# Patient Record
Sex: MALE | Race: BLACK OR AFRICAN AMERICAN | NOT HISPANIC OR LATINO | Employment: PART TIME | URBAN - METROPOLITAN AREA
[De-identification: names, ages, dates, MRNs, and addresses within clinical notes are randomized per-mention and may not be internally consistent; named-entity substitution may affect disease eponyms.]

---

## 2020-06-14 ENCOUNTER — HOSPITAL ENCOUNTER (EMERGENCY)
Facility: CLINIC | Age: 55
Discharge: HOME | End: 2020-06-15
Attending: EMERGENCY MEDICINE | Admitting: EMERGENCY MEDICINE
Payer: COMMERCIAL

## 2020-06-14 ENCOUNTER — APPOINTMENT (OUTPATIENT)
Dept: GENERAL RADIOLOGY | Facility: CLINIC | Age: 55
End: 2020-06-14
Attending: EMERGENCY MEDICINE
Payer: COMMERCIAL

## 2020-06-14 DIAGNOSIS — S20.219A CONTUSION OF CHEST WALL, UNSPECIFIED LATERALITY, INITIAL ENCOUNTER: ICD-10-CM

## 2020-06-14 DIAGNOSIS — S29.019A THORACIC MYOFASCIAL STRAIN, INITIAL ENCOUNTER: ICD-10-CM

## 2020-06-14 PROCEDURE — 72072 X-RAY EXAM THORAC SPINE 3VWS: CPT

## 2020-06-14 PROCEDURE — 99285 EMERGENCY DEPT VISIT HI MDM: CPT | Mod: 25

## 2020-06-14 PROCEDURE — 71046 X-RAY EXAM CHEST 2 VIEWS: CPT

## 2020-06-14 PROCEDURE — 25000132 ZZH RX MED GY IP 250 OP 250 PS 637: Performed by: EMERGENCY MEDICINE

## 2020-06-14 RX ORDER — METHOCARBAMOL 750 MG/1
750 TABLET, FILM COATED ORAL ONCE
Status: COMPLETED | OUTPATIENT
Start: 2020-06-14 | End: 2020-06-14

## 2020-06-14 RX ORDER — IBUPROFEN 600 MG/1
600 TABLET, FILM COATED ORAL ONCE
Status: COMPLETED | OUTPATIENT
Start: 2020-06-14 | End: 2020-06-14

## 2020-06-14 RX ADMIN — IBUPROFEN 600 MG: 600 TABLET, FILM COATED ORAL at 23:23

## 2020-06-14 RX ADMIN — METHOCARBAMOL TABLETS 750 MG: 750 TABLET, COATED ORAL at 23:23

## 2020-06-14 ASSESSMENT — ENCOUNTER SYMPTOMS
CHEST TIGHTNESS: 1
BACK PAIN: 1
NECK STIFFNESS: 1

## 2020-06-14 NOTE — ED AVS SNAPSHOT
[facility] Emergency Department  [address]  [facility] [address]  Phone:  [phone]  Fax:  [phone]                                    Justice Aguirre   MRN: [id number]    Department:  [facility] Emergency Department   Date of Visit:  6/14/2020           After Visit Summary Signature Page    I have received my discharge instructions, and my questions have been answered. I have discussed any challenges I see with this plan with the nurse or doctor.    ..........................................................................................................................................  Patient/Patient Representative Signature      ..........................................................................................................................................  Patient Representative Print Name and Relationship to Patient    ..................................................               ................................................  Date                                   Time    ..........................................................................................................................................  Reviewed by Signature/Title    ...................................................              ..............................................  Date                                               Time          22EPIC Rev 08/18

## 2020-06-15 ENCOUNTER — APPOINTMENT (OUTPATIENT)
Dept: CT IMAGING | Facility: CLINIC | Age: 55
End: 2020-06-15
Attending: EMERGENCY MEDICINE
Payer: COMMERCIAL

## 2020-06-15 VITALS
RESPIRATION RATE: 20 BRPM | DIASTOLIC BLOOD PRESSURE: 77 MMHG | OXYGEN SATURATION: 97 % | TEMPERATURE: 98.2 F | HEART RATE: 64 BPM | SYSTOLIC BLOOD PRESSURE: 133 MMHG

## 2020-06-15 PROCEDURE — 72128 CT CHEST SPINE W/O DYE: CPT

## 2020-06-15 PROCEDURE — 72131 CT LUMBAR SPINE W/O DYE: CPT

## 2020-06-15 RX ORDER — METHOCARBAMOL 750 MG/1
750 TABLET, FILM COATED ORAL EVERY 6 HOURS PRN
Qty: 30 TABLET | Refills: 0 | Status: SHIPPED | OUTPATIENT
Start: 2020-06-15 | End: 2020-06-25
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
## 2020-06-15 NOTE — DISCHARGE INSTRUCTIONS
Take ibuprofen 600 mg 3x per day.  This will provide both pain control and fight against inflammation.  Use robaxin as needed for additional pain control.
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
## 2020-06-25 ENCOUNTER — APPOINTMENT (OUTPATIENT)
Dept: CT IMAGING | Facility: CLINIC | Age: 55
End: 2020-06-25
Attending: EMERGENCY MEDICINE
Payer: COMMERCIAL

## 2020-06-25 ENCOUNTER — HOSPITAL ENCOUNTER (EMERGENCY)
Facility: CLINIC | Age: 55
Discharge: HOME | End: 2020-06-25
Attending: EMERGENCY MEDICINE | Admitting: EMERGENCY MEDICINE
Payer: COMMERCIAL

## 2020-06-25 ENCOUNTER — APPOINTMENT (OUTPATIENT)
Dept: MRI IMAGING | Facility: CLINIC | Age: 55
End: 2020-06-25
Attending: EMERGENCY MEDICINE
Payer: COMMERCIAL

## 2020-06-25 VITALS
OXYGEN SATURATION: 100 % | DIASTOLIC BLOOD PRESSURE: 82 MMHG | SYSTOLIC BLOOD PRESSURE: 132 MMHG | TEMPERATURE: 98.1 F | RESPIRATION RATE: 18 BRPM | HEART RATE: 57 BPM

## 2020-06-25 DIAGNOSIS — R29.898 WEAKNESS OF FOOT, LEFT: ICD-10-CM

## 2020-06-25 DIAGNOSIS — R68.89: ICD-10-CM

## 2020-06-25 DIAGNOSIS — S06.0X0A CONCUSSION WITHOUT LOSS OF CONSCIOUSNESS, INITIAL ENCOUNTER: ICD-10-CM

## 2020-06-25 DIAGNOSIS — R91.1 LUNG NODULE: ICD-10-CM

## 2020-06-25 LAB
ANION GAP SERPL CALCULATED.3IONS-SCNC: 4 MMOL/L (ref 3–14)
BASOPHILS # BLD AUTO: 0.1 10E9/L (ref 0–0.2)
BASOPHILS NFR BLD AUTO: 0.7 %
BUN SERPL-MCNC: 18 MG/DL (ref 7–30)
CALCIUM SERPL-MCNC: 8.8 MG/DL (ref 8.5–10.1)
CHLORIDE SERPL-SCNC: 104 MMOL/L (ref 94–109)
CO2 SERPL-SCNC: 30 MMOL/L (ref 20–32)
CREAT SERPL-MCNC: 1.41 MG/DL (ref 0.66–1.25)
DIFFERENTIAL METHOD BLD: NORMAL
EOSINOPHIL # BLD AUTO: 0.4 10E9/L (ref 0–0.7)
EOSINOPHIL NFR BLD AUTO: 4.5 %
ERYTHROCYTE [DISTWIDTH] IN BLOOD BY AUTOMATED COUNT: 14.5 % (ref 10–15)
GFR SERPL CREATININE-BSD FRML MDRD: 55 ML/MIN/{1.73_M2}
GLUCOSE SERPL-MCNC: 82 MG/DL (ref 70–99)
HCT VFR BLD AUTO: 49.2 % (ref 40–53)
HGB BLD-MCNC: 15.5 G/DL (ref 13.3–17.7)
IMM GRANULOCYTES # BLD: 0 10E9/L (ref 0–0.4)
IMM GRANULOCYTES NFR BLD: 0.2 %
LYMPHOCYTES # BLD AUTO: 3.6 10E9/L (ref 0.8–5.3)
LYMPHOCYTES NFR BLD AUTO: 41.3 %
MCH RBC QN AUTO: 28.5 PG (ref 26.5–33)
MCHC RBC AUTO-ENTMCNC: 31.5 G/DL (ref 31.5–36.5)
MCV RBC AUTO: 90 FL (ref 78–100)
MONOCYTES # BLD AUTO: 0.6 10E9/L (ref 0–1.3)
MONOCYTES NFR BLD AUTO: 7.5 %
NEUTROPHILS # BLD AUTO: 3.9 10E9/L (ref 1.6–8.3)
NEUTROPHILS NFR BLD AUTO: 45.8 %
NRBC # BLD AUTO: 0 10*3/UL
NRBC BLD AUTO-RTO: 0 /100
PLATELET # BLD AUTO: 215 10E9/L (ref 150–450)
POTASSIUM SERPL-SCNC: 4.3 MMOL/L (ref 3.4–5.3)
RBC # BLD AUTO: 5.44 10E12/L (ref 4.4–5.9)
SODIUM SERPL-SCNC: 138 MMOL/L (ref 133–144)
WBC # BLD AUTO: 8.6 10E9/L (ref 4–11)

## 2020-06-25 PROCEDURE — 85025 COMPLETE CBC W/AUTO DIFF WBC: CPT | Performed by: EMERGENCY MEDICINE

## 2020-06-25 PROCEDURE — 99284 EMERGENCY DEPT VISIT MOD MDM: CPT | Mod: 25

## 2020-06-25 PROCEDURE — 70498 CT ANGIOGRAPHY NECK: CPT

## 2020-06-25 PROCEDURE — 25000125 ZZHC RX 250: Performed by: EMERGENCY MEDICINE

## 2020-06-25 PROCEDURE — 25000128 H RX IP 250 OP 636: Performed by: EMERGENCY MEDICINE

## 2020-06-25 PROCEDURE — 25000132 ZZH RX MED GY IP 250 OP 250 PS 637: Performed by: EMERGENCY MEDICINE

## 2020-06-25 PROCEDURE — 70553 MRI BRAIN STEM W/O & W/DYE: CPT

## 2020-06-25 PROCEDURE — 25500064 ZZH RX 255 OP 636: Performed by: EMERGENCY MEDICINE

## 2020-06-25 PROCEDURE — 72125 CT NECK SPINE W/O DYE: CPT

## 2020-06-25 PROCEDURE — 70450 CT HEAD/BRAIN W/O DYE: CPT

## 2020-06-25 PROCEDURE — 80048 BASIC METABOLIC PNL TOTAL CA: CPT | Performed by: EMERGENCY MEDICINE

## 2020-06-25 PROCEDURE — A9585 GADOBUTROL INJECTION: HCPCS | Performed by: EMERGENCY MEDICINE

## 2020-06-25 RX ORDER — ACETAMINOPHEN 325 MG/1
650 TABLET ORAL ONCE
Status: COMPLETED | OUTPATIENT
Start: 2020-06-25 | End: 2020-06-25

## 2020-06-25 RX ORDER — GADOBUTROL 604.72 MG/ML
10 INJECTION INTRAVENOUS ONCE
Status: COMPLETED | OUTPATIENT
Start: 2020-06-25 | End: 2020-06-25

## 2020-06-25 RX ORDER — CYCLOBENZAPRINE HCL 10 MG
5-10 TABLET ORAL 3 TIMES DAILY PRN
Qty: 10 TABLET | Refills: 0 | Status: SHIPPED | OUTPATIENT
Start: 2020-06-25

## 2020-06-25 RX ORDER — IOPAMIDOL 755 MG/ML
500 INJECTION, SOLUTION INTRAVASCULAR ONCE
Status: COMPLETED | OUTPATIENT
Start: 2020-06-25 | End: 2020-06-25

## 2020-06-25 RX ADMIN — ACETAMINOPHEN 650 MG: 325 TABLET, FILM COATED ORAL at 02:41

## 2020-06-25 RX ADMIN — SODIUM CHLORIDE 80 ML: 9 INJECTION, SOLUTION INTRAVENOUS at 03:37

## 2020-06-25 RX ADMIN — IOPAMIDOL 70 ML: 755 INJECTION, SOLUTION INTRAVENOUS at 03:37

## 2020-06-25 RX ADMIN — GADOBUTROL 8 ML: 604.72 INJECTION INTRAVENOUS at 07:12

## 2020-06-25 ASSESSMENT — ENCOUNTER SYMPTOMS
HEADACHES: 1
LIGHT-HEADEDNESS: 1
CONFUSION: 1

## 2020-06-25 NOTE — DISCHARGE INSTRUCTIONS
Diagnosis: Probable concussion, left foot weakness, abnormal finger-to-nose test  What do you do next:   Please continue over-the-counter treatments for your headache.    There was a lung nodule noted on the CT scan of the blood vessels in your neck.  Once you follow with your primary care clinic further CT scan imaging can be performed of the chest.  Lung nodules certainly need monitoring always concerned for cancer with these things.  I have placed a lung nodule neurologist to follow-up with after you discuss the case with your primary care clinic.  I placed an order for the concussion clinic to call you.  They can help arrange further follow-up regarding the lightheadedness and other symptoms that you are feeling.    Please follow-up with your  primary care clinic.  If you do not have one, I have listed a clinic as a courtesy to you.    When do you return: If you have worsening weakness, focal weakness including loss of function of an arm, leg, or part of your face, if you have worsening dizziness, intractable vomiting, or any other symptoms that concern you, please return to the emergency department for reevaluation.    Thank you for allowing us to care for you today.

## 2020-06-25 NOTE — ED NOTES
Patient alert and oriented. Speech clear. Able to move extremities purposefully. Work note given and discharge medication discussed. Reinforced plan to discontinue robaxin and start taking flexeril. MD in to see patient and discuss diagnosis, test results, and discharge plan. Patient meets discharge criteria. Discussed AVS with patient. Questions answered. Patient verbalized understanding. Concussion clinic to call patient for follow up. Discussed recommendation to establish primary care clinic/provider. Patient reports being ready to go home. Patient discharged home with friend by car with all necessary information.
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
## 2020-06-25 NOTE — ED AVS SNAPSHOT
[facility] Emergency Department  [address]  [facility] [address]  Phone:  [phone]  Fax:  [phone]                                    Justice Aguirre   MRN: [id number]    Department:  [facility] Emergency Department   Date of Visit:  6/25/2020           After Visit Summary Signature Page    I have received my discharge instructions, and my questions have been answered. I have discussed any challenges I see with this plan with the nurse or doctor.    ..........................................................................................................................................  Patient/Patient Representative Signature      ..........................................................................................................................................  Patient Representative Print Name and Relationship to Patient    ..................................................               ................................................  Date                                   Time    ..........................................................................................................................................  Reviewed by Signature/Title    ...................................................              ..............................................  Date                                               Time          22EPIC Rev 08/18
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
## 2020-06-25 NOTE — LETTER
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  I expect his condition to improve over the next couple days.  He may return to work/school 6/27/20.    Sincerely,        Analia Muñoz RN

## 2020-06-25 NOTE — ED NOTES
Pt. Returned from MRI. [employer] monitoring equipment onto Patient (Pulse ox and BP). Phone brought for Pt.

## 2020-07-16 ENCOUNTER — HOSPITAL ENCOUNTER (OUTPATIENT)
Dept: NEUROLOGY | Facility: CLINIC | Age: 55
Setting detail: THERAPIES SERIES
Discharge: STILL ADMITTED OR ADMINISTRATIVE | End: 2020-07-16
Attending: NURSE PRACTITIONER

## 2020-07-16 DIAGNOSIS — R45.4 IRRITABILITY: ICD-10-CM

## 2020-07-16 DIAGNOSIS — R42 DIZZINESS: ICD-10-CM

## 2020-07-16 DIAGNOSIS — R11.0 NAUSEA: ICD-10-CM

## 2020-07-16 DIAGNOSIS — G44.309 POST-CONCUSSION HEADACHE: ICD-10-CM

## 2020-07-16 DIAGNOSIS — G47.00 INSOMNIA, UNSPECIFIED TYPE: ICD-10-CM

## 2020-07-16 DIAGNOSIS — R53.83 FATIGUE, UNSPECIFIED TYPE: ICD-10-CM

## 2020-07-16 DIAGNOSIS — Z76.89 RETURN TO WORK EVALUATION: ICD-10-CM

## 2020-07-16 DIAGNOSIS — F06.4 ANXIETY DISORDER DUE TO MEDICAL CONDITION: ICD-10-CM

## 2020-07-16 DIAGNOSIS — R41.3 MEMORY DIFFICULTY: ICD-10-CM

## 2020-07-16 DIAGNOSIS — R41.840 ATTENTION AND CONCENTRATION DEFICIT: ICD-10-CM

## 2020-07-16 DIAGNOSIS — R68.89 SENSITIVITY TO LIGHT: ICD-10-CM

## 2020-07-16 DIAGNOSIS — F07.81 POST CONCUSSION SYNDROME: ICD-10-CM

## 2020-07-16 DIAGNOSIS — H83.3X3 SOUND SENSITIVITY IN BOTH EARS: ICD-10-CM

## 2020-07-21 ENCOUNTER — HOSPITAL ENCOUNTER (OUTPATIENT)
Dept: NEUROLOGY | Facility: CLINIC | Age: 55
Setting detail: THERAPIES SERIES
Discharge: STILL ADMITTED OR ADMINISTRATIVE | End: 2020-07-21
Attending: CLINICAL NEUROPSYCHOLOGIST

## 2020-07-21 DIAGNOSIS — F07.81 POST CONCUSSION SYNDROME: ICD-10-CM

## 2020-07-21 DIAGNOSIS — F43.23 ADJUSTMENT DISORDER WITH MIXED ANXIETY AND DEPRESSED MOOD: ICD-10-CM

## 2020-07-21 DIAGNOSIS — G44.309 POST-CONCUSSION HEADACHE: ICD-10-CM

## 2020-07-21 DIAGNOSIS — G31.84 MILD NEUROCOGNITIVE DISORDER: ICD-10-CM

## 2020-07-27 ENCOUNTER — HOSPITAL ENCOUNTER (OUTPATIENT)
Dept: NEUROLOGY | Facility: CLINIC | Age: 55
Setting detail: THERAPIES SERIES
Discharge: STILL ADMITTED OR ADMINISTRATIVE | End: 2020-07-27
Attending: CLINICAL NEUROPSYCHOLOGIST

## 2020-07-27 DIAGNOSIS — F43.23 ADJUSTMENT DISORDER WITH MIXED ANXIETY AND DEPRESSED MOOD: ICD-10-CM

## 2020-07-27 DIAGNOSIS — G31.84 MILD NEUROCOGNITIVE DISORDER: ICD-10-CM

## 2020-07-29 ENCOUNTER — HOSPITAL ENCOUNTER (OUTPATIENT)
Dept: PHYSICAL THERAPY | Facility: CLINIC | Age: 55
Setting detail: THERAPIES SERIES
End: 2020-07-29
Attending: NURSE PRACTITIONER
Payer: COMMERCIAL

## 2020-07-29 PROCEDURE — 97161 PT EVAL LOW COMPLEX 20 MIN: CPT | Mod: GP | Performed by: PHYSICAL THERAPIST

## 2020-07-29 PROCEDURE — 97112 NEUROMUSCULAR REEDUCATION: CPT | Mod: GP | Performed by: PHYSICAL THERAPIST

## 2020-07-29 PROCEDURE — 97110 THERAPEUTIC EXERCISES: CPT | Mod: GP | Performed by: PHYSICAL THERAPIST
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
## 2020-08-04 ENCOUNTER — HOSPITAL ENCOUNTER (OUTPATIENT)
Dept: NEUROLOGY | Facility: CLINIC | Age: 55
Setting detail: THERAPIES SERIES
Discharge: STILL ADMITTED OR ADMINISTRATIVE | End: 2020-08-04
Attending: NURSE PRACTITIONER

## 2020-08-04 DIAGNOSIS — F06.4 ANXIETY DISORDER DUE TO MEDICAL CONDITION: ICD-10-CM

## 2020-08-04 DIAGNOSIS — R53.83 FATIGUE, UNSPECIFIED TYPE: ICD-10-CM

## 2020-08-04 DIAGNOSIS — R41.3 MEMORY DIFFICULTIES: ICD-10-CM

## 2020-08-04 DIAGNOSIS — Z76.89 RETURN TO WORK EVALUATION: ICD-10-CM

## 2020-08-04 DIAGNOSIS — F07.81 POST CONCUSSION SYNDROME: ICD-10-CM

## 2020-08-04 DIAGNOSIS — R41.840 ATTENTION AND CONCENTRATION DEFICIT: ICD-10-CM

## 2020-08-04 DIAGNOSIS — R45.4 IRRITABILITY: ICD-10-CM

## 2020-08-04 DIAGNOSIS — R11.0 NAUSEA: ICD-10-CM

## 2020-08-04 DIAGNOSIS — G44.309 POST-CONCUSSION HEADACHE: ICD-10-CM

## 2020-08-04 DIAGNOSIS — G47.00 INSOMNIA, UNSPECIFIED TYPE: ICD-10-CM

## 2020-08-04 DIAGNOSIS — R42 DIZZINESS: ICD-10-CM

## 2020-08-20 ENCOUNTER — HOSPITAL ENCOUNTER (OUTPATIENT)
Dept: NEUROLOGY | Facility: CLINIC | Age: 55
Setting detail: THERAPIES SERIES
Discharge: STILL ADMITTED OR ADMINISTRATIVE | End: 2020-08-20
Attending: NURSE PRACTITIONER

## 2020-08-20 DIAGNOSIS — R45.4 IRRITABILITY: ICD-10-CM

## 2020-08-20 DIAGNOSIS — M25.512 ACUTE PAIN OF LEFT SHOULDER: ICD-10-CM

## 2020-08-20 DIAGNOSIS — F06.4 ANXIETY DISORDER DUE TO MEDICAL CONDITION: ICD-10-CM

## 2020-08-20 DIAGNOSIS — Z76.89 RETURN TO WORK EVALUATION: ICD-10-CM

## 2020-08-20 DIAGNOSIS — R41.840 ATTENTION AND CONCENTRATION DEFICIT: ICD-10-CM

## 2020-08-20 DIAGNOSIS — R41.3 MEMORY DIFFICULTIES: ICD-10-CM

## 2020-08-20 DIAGNOSIS — R42 DIZZINESS: ICD-10-CM

## 2020-08-20 DIAGNOSIS — G44.309 POST-CONCUSSION HEADACHE: ICD-10-CM

## 2020-08-20 DIAGNOSIS — R53.83 FATIGUE, UNSPECIFIED TYPE: ICD-10-CM

## 2020-08-20 DIAGNOSIS — H83.3X3 SOUND SENSITIVITY IN BOTH EARS: ICD-10-CM

## 2020-08-20 DIAGNOSIS — R68.89 SENSITIVITY TO LIGHT: ICD-10-CM

## 2020-08-20 DIAGNOSIS — G47.00 INSOMNIA, UNSPECIFIED TYPE: ICD-10-CM

## 2020-08-20 DIAGNOSIS — F07.81 POSTCONCUSSION SYNDROME: ICD-10-CM

## 2020-09-08 ENCOUNTER — OFFICE VISIT (OUTPATIENT)
Dept: URGENT CARE | Facility: URGENT CARE | Age: 55
End: 2020-09-08
Payer: COMMERCIAL

## 2020-09-08 VITALS
TEMPERATURE: 98.4 F | WEIGHT: 188 LBS | DIASTOLIC BLOOD PRESSURE: 89 MMHG | HEART RATE: 84 BPM | SYSTOLIC BLOOD PRESSURE: 159 MMHG | RESPIRATION RATE: 14 BRPM | OXYGEN SATURATION: 98 %

## 2020-09-08 DIAGNOSIS — M25.512 LEFT SHOULDER PAIN, UNSPECIFIED CHRONICITY: Primary | ICD-10-CM

## 2020-09-08 PROBLEM — M51.27 HERNIATION OF INTERVERTEBRAL DISC BETWEEN L5 AND S1: Status: ACTIVE | Noted: 2019-01-14

## 2020-09-08 PROCEDURE — 99203 OFFICE O/P NEW LOW 30 MIN: CPT | Performed by: PHYSICIAN ASSISTANT

## 2020-09-08 RX ORDER — ALBUTEROL SULFATE 90 UG/1
AEROSOL, METERED RESPIRATORY (INHALATION)
COMMUNITY
Start: 2020-03-04

## 2020-09-08 RX ORDER — OMEPRAZOLE 40 MG/1
40 CAPSULE, DELAYED RELEASE ORAL
COMMUNITY
Start: 2019-01-28

## 2020-09-08 RX ORDER — GABAPENTIN 400 MG/1
400 CAPSULE ORAL
COMMUNITY

## 2020-09-08 RX ORDER — BUPROPION HYDROCHLORIDE 150 MG/1
300 TABLET ORAL
COMMUNITY
Start: 2020-08-04 | End: 2021-08-26

## 2020-09-08 RX ORDER — NAPROXEN 500 MG/1
500 TABLET ORAL
COMMUNITY
Start: 2019-01-14

## 2020-09-08 RX ORDER — METHYLPHENIDATE HYDROCHLORIDE 5 MG/1
5 TABLET ORAL
COMMUNITY
Start: 2020-08-20

## 2020-09-08 RX ORDER — ERGOCALCIFEROL 1.25 MG/1
CAPSULE, LIQUID FILLED ORAL
COMMUNITY
Start: 2019-01-28

## 2020-09-08 RX ORDER — FLUTICASONE PROPIONATE 50 MCG
SPRAY, SUSPENSION (ML) NASAL
COMMUNITY
Start: 2019-09-04

## 2020-09-08 ASSESSMENT — ENCOUNTER SYMPTOMS
CHILLS: 0
VOMITING: 0
ABDOMINAL PAIN: 0
LIGHT-HEADEDNESS: 0
GASTROINTESTINAL NEGATIVE: 1
SHORTNESS OF BREATH: 0
COUGH: 0
DYSURIA: 0
EYES NEGATIVE: 1
ENDOCRINE NEGATIVE: 1
DIZZINESS: 0
WEAKNESS: 0
FEVER: 0
NEUROLOGICAL NEGATIVE: 1
ARTHRALGIAS: 1
CARDIOVASCULAR NEGATIVE: 1
DIAPHORESIS: 0
NAUSEA: 0
HEMATURIA: 0
DIARRHEA: 0
RESPIRATORY NEGATIVE: 1
HEADACHES: 0
PALPITATIONS: 0
WHEEZING: 0
MYALGIAS: 0
RHINORRHEA: 0
EYE REDNESS: 0
CONSTITUTIONAL NEGATIVE: 1
ADENOPATHY: 0
SORE THROAT: 0
EYE DISCHARGE: 0
CHEST TIGHTNESS: 0
FREQUENCY: 0
EYE ITCHING: 0
POLYDIPSIA: 0

## 2020-09-08 ASSESSMENT — PAIN SCALES - GENERAL: PAINLEVEL: WORST PAIN (10)
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
## 2020-09-18 ENCOUNTER — HOSPITAL ENCOUNTER (OUTPATIENT)
Dept: NEUROLOGY | Facility: CLINIC | Age: 55
Setting detail: THERAPIES SERIES
Discharge: STILL ADMITTED OR ADMINISTRATIVE | End: 2020-09-18
Attending: NURSE PRACTITIONER

## 2020-09-18 DIAGNOSIS — R41.840 ATTENTION AND CONCENTRATION DEFICIT: ICD-10-CM

## 2020-09-18 DIAGNOSIS — F07.81 POSTCONCUSSION SYNDROME: ICD-10-CM

## 2020-09-18 DIAGNOSIS — R53.83 FATIGUE, UNSPECIFIED TYPE: ICD-10-CM

## 2020-09-18 DIAGNOSIS — R68.89 SENSITIVITY TO LIGHT: ICD-10-CM

## 2020-09-18 DIAGNOSIS — R45.4 IRRITABILITY: ICD-10-CM

## 2020-09-18 DIAGNOSIS — G44.309 POST-CONCUSSION HEADACHE: ICD-10-CM

## 2020-09-18 DIAGNOSIS — H83.3X3 SOUND SENSITIVITY IN BOTH EARS: ICD-10-CM

## 2020-09-18 DIAGNOSIS — G47.00 INSOMNIA, UNSPECIFIED TYPE: ICD-10-CM

## 2020-09-18 DIAGNOSIS — Z76.89 RETURN TO WORK EVALUATION: ICD-10-CM

## 2020-09-18 DIAGNOSIS — M25.512 ACUTE PAIN OF LEFT SHOULDER: ICD-10-CM

## 2020-09-18 DIAGNOSIS — R41.3 MEMORY DIFFICULTIES: ICD-10-CM

## 2020-09-18 DIAGNOSIS — F06.4 ANXIETY DISORDER DUE TO MEDICAL CONDITION: ICD-10-CM

## 2020-09-30 ENCOUNTER — HOSPITAL ENCOUNTER (OUTPATIENT)
Dept: MRI IMAGING | Facility: HOSPITAL | Age: 55
Discharge: HOME | End: 2020-09-30
Attending: NURSE PRACTITIONER

## 2020-09-30 DIAGNOSIS — M25.512 ACUTE PAIN OF LEFT SHOULDER: ICD-10-CM

## 2020-10-16 ENCOUNTER — HOSPITAL ENCOUNTER (OUTPATIENT)
Dept: NEUROLOGY | Facility: CLINIC | Age: 55
Setting detail: THERAPIES SERIES
Discharge: STILL ADMITTED OR ADMINISTRATIVE | End: 2020-10-16
Attending: NURSE PRACTITIONER

## 2020-10-16 DIAGNOSIS — G47.00 INSOMNIA, UNSPECIFIED TYPE: ICD-10-CM

## 2020-10-16 DIAGNOSIS — G44.309 POST-CONCUSSION HEADACHE: ICD-10-CM

## 2020-10-16 DIAGNOSIS — F07.81 POSTCONCUSSION SYNDROME: ICD-10-CM

## 2020-10-16 DIAGNOSIS — R52 PAIN: ICD-10-CM

## 2020-10-16 DIAGNOSIS — R53.83 FATIGUE, UNSPECIFIED TYPE: ICD-10-CM

## 2020-10-16 DIAGNOSIS — R41.840 ATTENTION AND CONCENTRATION DEFICIT: ICD-10-CM

## 2020-10-16 DIAGNOSIS — R41.3 MEMORY DIFFICULTIES: ICD-10-CM

## 2020-10-16 DIAGNOSIS — R45.4 IRRITABILITY: ICD-10-CM

## 2020-10-16 DIAGNOSIS — H83.3X3 SOUND SENSITIVITY IN BOTH EARS: ICD-10-CM

## 2020-10-16 DIAGNOSIS — R68.89 SENSITIVITY TO LIGHT: ICD-10-CM

## 2020-10-16 DIAGNOSIS — F06.4 ANXIETY DISORDER DUE TO MEDICAL CONDITION: ICD-10-CM

## 2020-10-16 DIAGNOSIS — Z76.89 RETURN TO WORK EVALUATION: ICD-10-CM

## 2020-10-16 ASSESSMENT — MIFFLIN-ST. JEOR: SCORE: 1566.54

## 2020-12-22 ENCOUNTER — COMMUNICATION - HEALTHEAST (OUTPATIENT)
Dept: NEUROLOGY | Facility: CLINIC | Age: 55
End: 2020-12-22

## 2020-12-22 DIAGNOSIS — F06.4 ANXIETY DISORDER DUE TO MEDICAL CONDITION: ICD-10-CM

## 2021-01-15 ENCOUNTER — HEALTH MAINTENANCE LETTER (OUTPATIENT)
Age: 56
End: 2021-01-15

## 2021-01-24 ENCOUNTER — COMMUNICATION - HEALTHEAST (OUTPATIENT)
Dept: NEUROLOGY | Facility: CLINIC | Age: 56
End: 2021-01-24

## 2021-01-24 DIAGNOSIS — F06.4 ANXIETY DISORDER DUE TO MEDICAL CONDITION: ICD-10-CM

## 2021-02-28 ENCOUNTER — COMMUNICATION - HEALTHEAST (OUTPATIENT)
Dept: NEUROLOGY | Facility: CLINIC | Age: 56
End: 2021-02-28

## 2021-02-28 DIAGNOSIS — F06.4 ANXIETY DISORDER DUE TO MEDICAL CONDITION: ICD-10-CM

## 2021-03-03 ENCOUNTER — COMMUNICATION - HEALTHEAST (OUTPATIENT)
Dept: NEUROLOGY | Facility: CLINIC | Age: 56
End: 2021-03-03

## 2021-03-03 DIAGNOSIS — F06.4 ANXIETY DISORDER DUE TO MEDICAL CONDITION: ICD-10-CM

## 2021-03-16 ENCOUNTER — HOSPITAL ENCOUNTER (OUTPATIENT)
Dept: NEUROLOGY | Facility: CLINIC | Age: 56
Setting detail: THERAPIES SERIES
Discharge: STILL ADMITTED OR ADMINISTRATIVE | End: 2021-03-16
Attending: NURSE PRACTITIONER

## 2021-03-16 DIAGNOSIS — R41.840 ATTENTION AND CONCENTRATION DEFICIT: ICD-10-CM

## 2021-04-14 ENCOUNTER — HOSPITAL ENCOUNTER (OUTPATIENT)
Dept: PHYSICAL THERAPY | Facility: CLINIC | Age: 56
Setting detail: THERAPIES SERIES
End: 2021-04-14
Attending: NURSE PRACTITIONER
Payer: COMMERCIAL

## 2021-04-14 PROCEDURE — 97110 THERAPEUTIC EXERCISES: CPT | Mod: GP | Performed by: PHYSICAL THERAPIST

## 2021-04-14 PROCEDURE — 97161 PT EVAL LOW COMPLEX 20 MIN: CPT | Mod: GP | Performed by: PHYSICAL THERAPIST
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
## 2021-04-27 ENCOUNTER — COMMUNICATION - HEALTHEAST (OUTPATIENT)
Dept: NEUROLOGY | Facility: CLINIC | Age: 56
End: 2021-04-27

## 2021-04-27 DIAGNOSIS — F06.4 ANXIETY DISORDER DUE TO MEDICAL CONDITION: ICD-10-CM

## 2021-05-05 ENCOUNTER — OFFICE VISIT (OUTPATIENT)
Dept: ORTHOPEDICS | Facility: CLINIC | Age: 56
End: 2021-05-05
Payer: COMMERCIAL

## 2021-05-05 VITALS — DIASTOLIC BLOOD PRESSURE: 81 MMHG | HEART RATE: 82 BPM | SYSTOLIC BLOOD PRESSURE: 130 MMHG

## 2021-05-05 DIAGNOSIS — M51.369 DDD (DEGENERATIVE DISC DISEASE), LUMBAR: ICD-10-CM

## 2021-05-05 DIAGNOSIS — M62.838 CERVICAL PARASPINAL MUSCLE SPASM: ICD-10-CM

## 2021-05-05 DIAGNOSIS — M51.34 DDD (DEGENERATIVE DISC DISEASE), THORACIC: ICD-10-CM

## 2021-05-05 DIAGNOSIS — M50.30 DDD (DEGENERATIVE DISC DISEASE), CERVICAL: Primary | ICD-10-CM

## 2021-05-05 PROCEDURE — 99204 OFFICE O/P NEW MOD 45 MIN: CPT | Performed by: PREVENTIVE MEDICINE
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
## 2021-05-05 NOTE — PATIENT INSTRUCTIONS
Thanks for coming today.  Ortho[facility]Sports Medicine Clinic  [address] [place] [address]    To schedule future appointments in Ortho Clinic, you may call [phone].    To schedule ordered imaging by your provider:   Call Central Imaging [phone]    To schedule an injection ordered by your provider:  Call Central [facility] Injection scheduling line: [phone]  [facility] available online at:  [email or web address]    Please call if any further questions or concerns ([phone]).  Clinic hours 8 am to 5 pm.    Return to clinic (call) if symptoms worsen or fail to improve.
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
## 2021-05-10 VITALS — BODY MASS INDEX: 25.46 KG/M2 | WEIGHT: 168 LBS | HEIGHT: 68 IN

## 2021-05-10 ASSESSMENT — MIFFLIN-ST. JEOR: SCORE: 1566.54

## 2021-05-10 NOTE — PROGRESS NOTES
Justice is a 55 year old who is being evaluated via a billable video visit.      How would you like to obtain your AVS? MyChart  If the video visit is dropped, the invitation should be resent by: Text to cell phone: [phone]  Will anyone else be joining your video visit? No        Video-Visit Details    Type of service:  Video Visit  Video Start Time:  09:15 am  Video End Time :  09:51 am  Originating Location (pt. Location): Home    Distant Location (provider location):  [facility]     Platform used for Video Visit: [facility]  Sleep Consultation Note     Date on this visit: 5/11/2021    Justice Aguirre is sent by Iris Arriaga for a sleep consultation regarding patient for possible sleep apnea    Primary Physician: Clinic, [facility]     Chief complaint: Fatigue, snoring    Justice Aguirre 55 year old male who presents to sleep clinic for virtual visit today to obtain evaluation for possible sleep apnea.  He had a previous sleep study, that did not show evidence of FAB.    Sleep study report:  [facility]  Date of sleep study: September 18, 2013  Weight at the time of sleep study 176 pounds  AHI 1/h RDI 3/h lowest O2 saturation 92%; Normal sinus rhythm. NoPLM's or bruxism or instances of REM sleep without atonia.    He reports weight gain of approximately 10-15 lbs since the PSG    Sleep Disordered Breathing  Justice  reports snoring, snort arousals, choking/gasping for air, witnessed apneas, dry mouth, occasional  morning headaches, non-refreshing sleep, daytime sleepiness/fatigue.    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  He has grand children and  other responsibilities    He reports  difficulty with falling asleep. Takes sometimes up to 1 hour.  Active mind, worrying effect sleep  He wakes up 3-4 times throughout the night.  Night time awakenings occurs due to breathing problems, snoring, occasionally to use bathroom.  Sometimes he can fall asleep easily other times it may be difficult to resume sleep.  He reports non-refreshing sleep, daytime sleepiness/fatigue.  Patient is a night person  Patient  denies drowsiness while driving.    Justice naps occasionally, does not feel rested after nap.  Patient does use electronics in bed.    SLEEP SCALES:  Patient's [place] Sleepiness score 16/24   Insomnia severity index:22    Restless Legs symptoms  Justice does not complain of restlessness feelings in the legs.      Sleep Behaviors  He denies any cataplexy,sleep hallucinations.  Has had episodes of sleep paralysis.  He denies any night time behaviors - sleep walking, sleep talking, sleep eating.  He does not complain acting out dreams.      Social History  Justice currently working .  .    Lives with wife, 4 grand children( 3 weeks old, 5 yrs, 7yrs 8yrs old) and his son. He drinks pop about 2-3 can/day. Last caffeine intake is not within 6 hours of bed time.  He does not drink  Alcohol. Patient is former smoker - quit 2 years ago  Patient does  not use drugs. Does not use medical marijuana     Allergies:    Allergies   Allergen Reactions     Prednisone Other (See Comments)     Injection only allergic reaction. hiccups       Medications:    Current Outpatient Medications   Medication Sig Dispense Refill     albuterol (PROAIR HFA/PROVENTIL HFA/VENTOLIN HFA) 108 (90 Base) MCG/ACT inhaler        amitriptyline (ELAVIL) 25 MG tablet Take 50 mg by mouth       buPROPion (WELLBUTRIN XL) 150 MG 24 hr tablet Take 300 mg by mouth       cyclobenzaprine (FLEXERIL) 10 MG tablet Take 0.5-1 tablets (5-10 mg) by mouth 3 times daily as needed for muscle spasms 10 tablet 0     fluticasone (FLONASE) 50  MCG/ACT nasal spray        gabapentin (NEURONTIN) 400 MG capsule Take 400 mg by mouth       methylphenidate (RITALIN) 5 MG tablet Take 5 mg by mouth       naproxen (NAPROSYN) 500 MG tablet Take 500 mg by mouth       omeprazole (PRILOSEC) 40 MG DR capsule Take 40 mg by mouth       tiZANidine (ZANAFLEX) 4 MG tablet Take 1-2 tablets (4-8 mg) by mouth nightly as needed 30 tablet 1     vitamin D2 (ERGOCALCIFEROL) 35810 units (1250 mcg) capsule          Problem List:  Patient Active Problem List    Diagnosis Date Noted     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 01/14/2019     Priority: Medium     Tobacco abuse 06/13/2013     Priority: Medium        Past Medical/Surgical History: Per care everywhere  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   Tubular adenoma of colon   Vitamin D deficiency   Tobacco abuse     Colonoscopy April 16, 2018    Social History:  Social History     Socioeconomic History     Marital status:      Spouse name: Not on file     Number of children: Not on file     Years of education: Not on file     Highest education level: Not on file   Occupational History     Not on file   Social Needs     Financial resource strain: Not on file     Food insecurity     Worry: Not on file     Inability: Not on file     Transportation needs     Medical: Not on file     Non-medical: Not on file   Tobacco Use     Smoking status: Former Smoker     Smokeless tobacco: Never Used   Substance and Sexual Activity     Alcohol use: Not on file     Drug use: Not on file     Sexual activity: Not on file   Lifestyle     Physical activity     Days per week: Not on file     Minutes per session: Not on file     Stress: Not on file   Relationships     Social connections     Talks on phone: Not on file     Gets together: Not on file     Attends Mandaen service: Not on file     Active member of club or organization: Not on file     Attends meetings of clubs or organizations: Not on file     Relationship  status: Not on file     Intimate partner violence     Fear of current or ex partner: Not on file     Emotionally abused: Not on file     Physically abused: Not on file     Forced sexual activity: Not on file   Other Topics Concern     Not on file   Social History Narrative     Not on file       Family History: per care Everywhere  Hypertension Brother       Cancer-colon Brother       Cancer-prostate Father       Psychiatric illness Maternal Grandmother   alzheimer's    Cancer-prostate Maternal Uncle       Cancer-prostate Maternal Uncle       Asthma Mother         Review of Systems:  Question: GENERAL HEALTH SYMPTOMS  Answer:   Yes     Question: SKIN SYMPTOMS  Answer:   No     Question: HEAD, EARS, NOSE AND THROAT SYMPTOMS  Answer:   Yes     Question: EYE SYMPTOMS  Answer:   Yes     Question: HEART SYMPTOMS  Answer:   No     Question: LUNG SYMPTOMS  Answer:   Yes     Question: INTESTINAL SYMPTOMS  Answer:   Yes     Question: URINARY SYMPTOMS  Answer:   No     Question: REPRODUCTIVE SYMPTOMS  Answer:   No     Question: SKELETAL SYMPTOMS  Answer:   Yes     Question: BLOOD SYMPTOMS  Answer:   No     Question: NERVOUS SYSTEM SYMPTOMS  Answer:   Yes     Question: MENTAL HEALTH SYMPTOMS  Answer:   Yes     Questionnaire: Please answer the questions below to tell us what conditions you are experiencing:     Question: Fever  Answer:   No     Question: Loss of appetite  Answer:   No     Question: Weight loss  Answer:   No     Question: Weight gain  Answer:   Yes     Question: Fatigue  Answer:   Yes     Question: Night sweats  Answer:   No     Question: Chills  Answer:   No     Question: Increased stress  Answer:   Yes     Question: Excessive hunger  Answer:   No     Question: Excessive thirst  Answer:   No     Question: Feeling hot or cold when others believe the temperature is normal  Answer:   Yes     Question: Loss of height  Answer:   No     Question: Post-operative complications  Answer:   No     Question: Surgical site  pain  Answer:   No     Question: Hallucinations  Answer:   No     Question: Change in or Loss of Energy  Answer:   Yes     Question: Hyperactivity  Answer:   Yes     Question: Confusion  Answer:   Yes     Questionnaire: Please answer the questions below to tell us what conditions you are experiencing:     Question: Ear pain  Answer:   Yes     Question: Ear discharge  Answer:   No     Question: Hearing loss  Answer:   No     Question: Ringing in your ears  Answer:   No     Question: Nosebleeds  Answer:   No     Question: Congestion  Answer:   Yes     Question: Sinus pain  Answer:   Yes     Question: Trouble swallowing  Answer:   No     Question:  Voice hoarseness  Answer:   No     Question: Mouth sores  Answer:   No     Question: Sore throat  Answer:   No     Question: Tooth pain  Answer:   No     Question: Gum tenderness  Answer:   No     Question: Bleeding gums  Answer:   No     Question: Change in taste  Answer:   No     Question: Change in sense of smell  Answer:   No     Question: Dry mouth  Answer:   No     Question: Hearing aid used  Answer:   No     Question: Neck lump  Answer:   No     Questionnaire: Please answer the questions below to tell us what conditions you are experiencing:     Question: Eye pain  Answer:   No     Question: Vision loss  Answer:   No     Question: Dry eyes  Answer:   No     Question: Watery eyes  Answer:   No     Question: Eye bulging  Answer:   No     Question: Double vision  Answer:   No     Question: Flashing of lights  Answer:   No     Question: Spots  Answer:   No     Question: Floaters  Answer:   No     Question: Redness  Answer:   Yes     Question: Crossed eyes  Answer:   No     Question: Tunnel Vision  Answer:   No     Question: Yellowing of eyes  Answer:   No     Question: Eye irritation  Answer:   No     Questionnaire: Please answer the questions below to tell us what condition you are experiencing:     Question: Cough  Answer:   Yes     Question: Sputum or phlegm  Answer:    Yes     Question: Coughing up blood  Answer:   No     Question: Difficulty breating or shortness of breath  Answer:   No     Question: Snoring  Answer:   Yes     Question: Wheezing  Answer:   Yes     Question: Difficulty breathing on exertion  Answer:   Yes     Question: Nighttime Cough  Answer:   Yes     Question: Difficulty breathing when lying flat  Answer:   Yes     Questionnaire: Please answer the questions below to tell us what conditions you are experiencing:     Question: Heart burn or indigestion  Answer:   Yes     Question: Nausea  Answer:   Yes     Question: Vomiting  Answer:   No     Question: Abdominal pain  Answer:   Yes     Question: Bloating  Answer:   Yes     Question: Constipation  Answer:   Yes     Question: Diarrhea  Answer:   No     Question: Blood in stool  Answer:   No     Question: Black stools  Answer:   No     Question: Rectal or Anal pain  Answer:   No     Question: Fecal incontinence  Answer:   No     Question: Yellowing of skin or eyes  Answer:   No     Question: Vomit with blood  Answer:   No     Question: Change in stools  Answer:   No     Questionnaire: Please answer the questions below to tell us what condition you are experiencing:     Question: Back pain  Answer:   Yes     Question: Muscle aches  Answer:   Yes     Question: Neck pain  Answer:   Yes     Question: Swollen joints  Answer:   No     Question: Joint pain  Answer:   Yes     Question: Bone pain  Answer:   No     Question: Muscle cramps  Answer:   No     Question: Muscle weakness  Answer:   Yes     Question: Joint stiffness  Answer:   Yes     Question: Bone fracture  Answer:   No     Questionnaire: Please answer the questions below to tell us what condition you are experiencing:     Question: Trouble with coordination  Answer:   Yes     Question: Dizziness or trouble with balance  Answer:   Yes     Question: Fainting or black-out spells  Answer:   No     Question: Memory loss  Answer:   Yes     Question: Headache  Answer:    "Yes     Question: Seizures  Answer:   No     Question: Speech problems  Answer:   No     Question: Tingling  Answer:   Yes     Question: Tremor  Answer:   No     Question: Weakness  Answer:   Yes     Question: Difficulty walking  Answer:   Yes     Question: Paralysis  Answer:   Yes     Question: Numbness  Answer:   Yes     Questionnaire: Please answer the questions below to tell us what condition you are experiencing:     Question: Nervous or Anxious  Answer:   Yes     Question: Depression  Answer:   No     Question: Trouble sleeping  Answer:   Yes     Question: Trouble thinking or concentrating  Answer:   Yes     Question: Mood changes  Answer:   Yes     Question: Panic attacks  Answer:   No       Physical Examination:  Vitals: Ht 1.727 m (5' 8\")   Wt 76.2 kg (168 lb)   BMI 25.54 kg/m    BMI= Body mass index is 25.54 kg/m .  Patient reported height:5'8\" weight:185 lbs  BMI:28.1 Kg/m2    Norton Total Score 5/10/2021   Total score - Norton 16       General: No apparent distress, appropriately groomed  Head: Normocephalic, atraumatic  Neck:Circumference: /2 inches  Chest: No cough, no audible wheezing, able to talk in full sentences  Skin: no rash  Psych: coherent speech, normal rate and volume, able to articulate logical thoughts, able   to abstract reason, no tangential thoughts, no hallucinations   or delusions  His affect is normal  Neuro:  Mental status: Alert and  Oriented X 3  Speech: normal       OTHER TESTS/LABS:   I have reviewed the labs and personally reviewed the imaging below and made my comment in the assessment and plan.  Last Comprehensive Metabolic Panel:  Sodium   Date Value Ref Range Status   06/25/2020 138 133 - 144 mmol/L Final     Potassium   Date Value Ref Range Status   06/25/2020 4.3 3.4 - 5.3 mmol/L Final     Chloride   Date Value Ref Range Status   06/25/2020 104 94 - 109 mmol/L Final     Carbon Dioxide   Date Value Ref Range Status   06/25/2020 30 20 - 32 mmol/L Final     Anion " Gap   Date Value Ref Range Status   06/25/2020 4 3 - 14 mmol/L Final     Glucose   Date Value Ref Range Status   06/25/2020 82 70 - 99 mg/dL Final     Urea Nitrogen   Date Value Ref Range Status   06/25/2020 18 7 - 30 mg/dL Final     Creatinine   Date Value Ref Range Status   06/25/2020 1.41 (H) 0.66 - 1.25 mg/dL Final     GFR Estimate   Date Value Ref Range Status   06/25/2020 55 (L) >60 mL/min/[1.73_m2] Final     Comment:     Non  GFR Calc  Starting 12/18/2018, serum creatinine based estimated GFR (eGFR) will be   calculated using the Chronic Kidney Disease Epidemiology Collaboration   (CKD-EPI) equation.       Calcium   Date Value Ref Range Status   06/25/2020 8.8 8.5 - 10.1 mg/dL Final       Impression/Plan:   Snoring, observed apneas,  non restorative sleep, fatigue, EDS. Possible Obstructive sleep apnea  STOP BANG score is 6/8. Patient likely has sleep apnea based on clinical history, male gender and neck circumference.   Recommend in-lab sleep study( due to circadian rhythm sleep wake disorder) to evaluate for FAB and if the sleep lab study is not covered by insurance we will consider obtaining HST and patient was agreeable with the plan.   HST/ Polysomnography reviewed.  Obstructive sleep apnea and consequences of untreated sleep apnea reviewed.  Information provided regarding treatment options for FAB.       Chronic insomnia-multifactorial.  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  We discussed stimulus control measures. Encouraged to follow good sleep hygiene/behavioral techniques.  His new work hours are from 2 PM till 10 PM  Recommended him to keep a regular sleep schedule with the goal bedtime of 1 AM and awakening time of 9 or 9:30 AM, aim of obtaining at least 7 to 8 hours of sleep per night.  Avoiding mind stimulating activities before bed, minimizing exposure to bright light a couple hours before bed  "and avoiding naps during the day.      Patient is a former smoker and has been encouraged to continue to not smoke.    We discussed weight management with healthy diet, and exercise.      Patient was strongly advised to avoid driving, operating any heavy machinery or other hazardous situations while drowsy or sleepy.  Patient was counseled on the importance of driving while alert, to pull over if drowsy, or nap before getting into the vehicle if sleepy.        Plan is to communicate results of sleep study in 10-14 days.     CC: Iris Arriaga        The above note was dictated using voice recognition software. Although reviewed after completion, some word and grammatical error may remain . Please contact the author for any clarifications.    \"I spent a total of 45 Minutes with Justice Aguirre during today's  Video visit, most  of this time was spent counseling the patient and  coordinating care regarding  FAB, HST/PSG, insomnia, shiftwork/delayed sleep phase and regularizing sleep schedule , weight management , and chart review., including documentation and further activities as noted above.\"      Srinivasa Torrez MD  [facility]   [address], [address]   [address] [place], [place] [address] [address]   Appointments: [phone]                           "

## 2021-05-10 NOTE — PATIENT INSTRUCTIONS
Your BMI is Body mass index is 25.54 kg/m .  Weight management is a personal decision.  If you are interested in exploring weight loss strategies, the following discussion covers the approaches that may be successful. Body mass index (BMI) is one way to tell whether you are at a healthy weight, overweight, or obese. It measures your weight in relation to your height.  A BMI of 18.5 to 24.9 is in the healthy range. A person with a BMI of 25 to 29.9 is considered overweight, and someone with a BMI of 30 or greater is considered obese. More than two-thirds of American adults are considered overweight or obese.  Being overweight or obese increases the risk for further weight gain. Excess weight may lead to heart disease and diabetes.  Creating and following plans for healthy eating and physical activity may help you improve your health.  Weight control is part of healthy lifestyle and includes exercise, emotional health, and healthy eating habits. Careful eating habits lifelong are the mainstay of weight control. Though there are significant health benefits from weight loss, long-term weight loss with diet alone may be very difficult to achieve- studies show long-term success with dietary management in less than 10% of people. Attaining a healthy weight may be especially difficult to achieve in those with severe obesity. In some cases, medications, devices and surgical management might be considered.  What can you do?  If you are overweight or obese and are interested in methods for weight loss, you should discuss this with your provider.     Consider reducing daily calorie intake by 500 calories.     Keep a food journal.     Avoiding skipping meals, consider cutting portions instead.    Diet combined with exercise helps maintain muscle while optimizing fat loss. Strength training is particularly important for building and maintaining muscle mass. Exercise helps reduce stress, increase energy, and improves fitness.  "Increasing exercise without diet control, however, may not burn enough calories to loose weight.       Start walking three days a week 10-20 minutes at a time    Work towards walking thirty minutes five days a week     Eventually, increase the speed of your walking for 1-2 minutes at time    In addition, we recommend that you review healthy lifestyles and methods for weight loss available through the National Institutes of Health patient information sites:  http://win.niddk.nih.gov/publications/index.htm    And look into health and wellness programs that may be available through your health insurance provider, employer, local community center, or [employer].    Weight management plan: Patient was referred to their PCP to discuss a diet and exercise plan.  MY TREATMENT INFORMATION FOR SLEEP APNEA-  Justice Aguirre    DOCTOR : Srinivasa Torrez MD    Am I having a sleep study at a sleep center?  --->Due to insurance clearance delays, you will be contacted within 2-4 weeks to schedule    Am I having a home sleep study?  --->Watch the video for the device you are using:    -/drop off device-   https://www.TinderBox.com/watch?v=yGGFBdELGhk  -Disposable device sent out require phone/computer application-   https://www.OluKaiube.com/watch?v=BCce_vbiwxE      Frequently asked questions:  1. What is Obstructive Sleep Apnea (FAB)? FAB is the most common type of sleep apnea. Apnea means, \"without breath.\"  Apnea is most often caused by narrowing or collapse of the upper airway as muscles relax during sleep.   Almost everyone has occasional apneas. Most people with sleep apnea have had brief interruptions at night frequently for many years.  The severity of sleep apnea is related to how frequent and severe the events are.   2. What are the consequences of FAB? Symptoms include: feeling sleepy during the day, snoring loudly, gasping or stopping of breathing, trouble sleeping, and occasionally morning " headaches or heartburn at night.  Sleepiness can be serious and even increase the risk of falling asleep while driving. Other health consequences may include development of high blood pressure and other cardiovascular disease in persons who are susceptible. Untreated FAB  can contribute to heart disease, stroke and diabetes.   3. What are the treatment options? In most situations, sleep apnea is a lifelong disease that must be managed with daily therapy. Medications are not effective for sleep apnea and surgery is generally not considered until other therapies have been tried. Your treatment is your choice . Continuous Positive Airway (CPAP) works right away and is the therapy that is effective in nearly everyone. An oral device to hold your jaw forward is usually the next most reliable option. Other options include postioning devices (to keep you off your back), weight loss, and surgery including a tongue pacing device. There is more detail about some of these options below.  4. Are my sleep studies covered by insurance? Although we will request verification of coverage, we advise you also check in advance of the study to ensure there is coverage.    Important tips for those choosing CPAP and similar devices   Know your equipment:  CPAP is continuous positive airway pressure that prevents obstructive sleep apnea by keeping the throat from collapsing while you are sleeping. In most cases, the device is  smart  and can slowly self-adjusts if your throat collapses and keeps a record every day of how well you are treated-this information is available to you and your care team.  BPAP is bilevel positive airway pressure that keeps your throat open and also assists each breath with a pressure boost to maintain adequate breathing.  Special kinds of BPAP are used in patients who have inadequate breathing from lung or heart disease. In most cases, the device is  smart  and can slowly self-adjusts to assist breathing. Like  CPAP, the device keeps a record of how well you are treated.  Your mask is your connection to the device. You get to choose what feels most comfortable and the staff will help to make sure if fits. Here: are some examples of the different masks that are available:       Key points to remember on your journey with sleep apnea:  1. Sleep study.  PAP devices often need to be adjusted during a sleep study to show that they are effective and adjusted right.  2. Good tips to remember: Try wearing just the mask during a quiet time during the day so your body adapts to wearing it. A humidifier is recommended for comfort in most cases to prevent drying of your nose and throat. Allergy medication from your provider may help you if you are having nasal congestion.  3. Getting settled-in. It takes more than one night for most of us to get used to wearing a mask. Try wearing just the mask during a quiet time during the day so your body adapts to wearing it. A humidifier is recommended for comfort in most cases. Our team will work with you carefully on the first day and will be in contact within 4 days and again at 2 and 4 weeks for advice and remote device adjustments. Your therapy is evaluated by the device each day.   4. Use it every night. The more you are able to sleep naturally for 7-8 hours, the more likely you will have good sleep and to prevent health risks or symptoms from sleep apnea. Even if you use it 4 hours it helps. Occasionally all of us are unable to use a medical therapy, in sleep apnea, it is not dangerous to miss one night.   5. Communicate. Call our skilled team on the number provided on the first day if your visit for problems that make it difficult to wear the device. Over 2 out of 3 patients can learn to wear the device long-term with help from our team. Remember to call our team or your sleep providers if you are unable to wear the device as we may have other solutions for those who cannot adapt to mask  CPAP therapy. It is recommended that you sleep your sleep provider within the first 3 months and yearly after that if you are not having problems.   6. Use it for your health. We encourage use of CPAP masks during daytime quiet periods to allow your face and brain to adapt to the sensation of CPAP so that it will be a more natural sensation to awaken to at night or during naps. This can be very useful during the first few weeks or months of adapting to CPAP though it does not help medically to wear CPAP during wakefulness and  should not be used as a strategy just to meet guidelines.  7. Take care of your equipment. Make sure you clean your mask and tubing using directions every day and that your filter and mask are replaced as recommended or if they are not working.     BESIDES CPAP, WHAT OTHER THERAPIES ARE THERE?    Positioning Device  Positioning devices are generally used when sleep apnea is mild and only occurs on your back.This example shows a pillow that straps around the waist. It may be appropriate for those whose sleep study shows milder sleep apnea that occurs primarily when lying flat on one's back. Preliminary studies have shown benefit but effectiveness at home may need to be verified by a home sleep test. These devices are generally not covered by medical insurance.  Examples of devices that maintain sleeping on the back to prevent snoring and mild sleep apnea.    Belt type body positioner  http://[employer]/    Electronic reminder  http://nightshifttherapy.com/  http://www.[employer]/      Oral Appliance  What is oral appliance therapy?  An oral appliance device fits on your teeth at night like a retainer used after having braces. The device is made by a specialized dentist and requires several visits over 1-2 months before a manufactured device is made to fit your teeth and is adjusted to prevent your sleep apnea. Once an oral device is working properly, snoring should be improved. A home  sleep test may be recommended at that time if to determine whether the sleep apnea is adequately treated.       Some things to remember:  -Oral devices are often, but not always, covered by your medical insurance. Be sure to check with your insurance provider.   -If you are referred for oral therapy, you will be given a list of specialized dentists to consider or you may choose to visit the Web site of the American Academy of Dental Sleep Medicine  -Oral devices are less likely to work if you have severe sleep apnea or are extremely overweight.     More detailed information  An oral appliance is a small acrylic device that fits over the upper and lower teeth  (similar to a retainer or a mouth guard). This device slightly moves jaw forward, which moves the base of the tongue forward, opens the airway, improves breathing for effective treat snoring and obstructive sleep apnea in perhaps 7 out of 10 people .  The best working devices are custom-made by a dental device  after a mold is made of the teeth 1, 2, 3.  When is an oral appliance indicated?  Oral appliance therapy is recommended as a first-line treatment for patients with primary snoring, mild sleep apnea, and for patients with moderate sleep apnea who prefer appliance therapy to use of CPAP4, 5. Severity of sleep apnea is determined by sleep testing and is based on the number of respiratory events per hour of sleep.   How successful is oral appliance therapy?  The success rate of oral appliance therapy in patients with mild sleep apnea is 75-80% while in patients with moderate sleep apnea it is 50-70%. The chance of success in patients with severe sleep apnea is 40-50%. The research also shows that oral appliances have a beneficial effect on the cardiovascular health of FAB patients at the same magnitude as CPAP therapy7.  Oral appliances should be a second-line treatment in cases of severe sleep apnea, but if not completely successful then a  combination therapy utilizing CPAP plus oral appliance therapy may be effective. Oral appliances tend to be effective in a broad range of patients although studies show that the patients who have the highest success are females, younger patients, those with milder disease, and less severe obesity. 3, 6.   Finding a dentist that practices dental sleep medicine  Specific training is available through the American Academy of Dental Sleep Medicine for dentists interested in working in the field of sleep. To find a dentist who is educated in the field of sleep and the use of oral appliances, near you, visit the Web site of the American Academy of Dental Sleep Medicine.    References  1. Aime et al. Objectively measured vs self-reported compliance during oral appliance therapy for sleep-disordered breathing. Chest 2013; 144(5): 7775-4164.  2. Wendy et al. Objective measurement of compliance during oral appliance therapy for sleep-disordered breathing. Thorax 2013; 68(1): 91-96.  3. Ricky, et al. Mandibular advancement devices in 620 men and women with FAB and snoring: tolerability and predictors of treatment success. Chest 2004; 125: 8928-8267.  4. Britany, et al. Oral appliances for snoring and FAB: a review. Sleep 2006; 29: 244-262.  5. Nirali, et al. Oral appliance treatment for FAB: an update. J Clin Sleep Med 2014; 10(2): 215-227.  6. Roman et al. Predictors of OSAH treatment outcome. J Dent Res 2007; 86: 6106-1740.      Weight Loss:    Weight loss is a long-term strategy that may improve sleep apnea in some patients.    Weight management is a personal decision and the decision should be based on your interest and the potential benefits.  If you are interested in exploring weight loss strategies, the following discussion covers the impact on weight loss on sleep apnea and the approaches that may be successful.    Being overweight does not necessarily mean you will have health consequences.   Those who have BMI over 35 or over 27 with existing medical conditions carries greater risk.   Weight loss decreases severity of sleep apnea in most people with obesity. For those with mild obesity who have developed snoring with weight gain, even 15-30 pound weight loss can improve and occasionally eliminate sleep apnea.  Structured and life-long dietary and health habits are necessary to lose weight and keep healthier weight levels.     Though there may be significant health benefits from weight loss, long-term weight loss is very difficult to achieve- studies show success with dietary management in less than 10% of people. In addition, substantial weight loss may require years of dietary control and may be difficult if patients have severe obesity. In these cases, surgical management may be considered.  Finally, older individuals who have tolerated obesity without health complications may be less likely to benefit from weight loss strategies.        Your BMI is Body mass index is 25.54 kg/m .  Weight management is a personal decision.  If you are interested in exploring weight loss strategies, the following discussion covers the approaches that may be successful. Body mass index (BMI) is one way to tell whether you are at a healthy weight, overweight, or obese. It measures your weight in relation to your height.  A BMI of 18.5 to 24.9 is in the healthy range. A person with a BMI of 25 to 29.9 is considered overweight, and someone with a BMI of 30 or greater is considered obese. More than two-thirds of American adults are considered overweight or obese.  Being overweight or obese increases the risk for further weight gain. Excess weight may lead to heart disease and diabetes.  Creating and following plans for healthy eating and physical activity may help you improve your health.  Weight control is part of healthy lifestyle and includes exercise, emotional health, and healthy eating habits. Careful eating habits  lifelong are the mainstay of weight control. Though there are significant health benefits from weight loss, long-term weight loss with diet alone may be very difficult to achieve- studies show long-term success with dietary management in less than 10% of people. Attaining a healthy weight may be especially difficult to achieve in those with severe obesity. In some cases, medications, devices and surgical management might be considered.  What can you do?  If you are overweight or obese and are interested in methods for weight loss, you should discuss this with your provider.     Consider reducing daily calorie intake by 500 calories.     Keep a food journal.     Avoiding skipping meals, consider cutting portions instead.    Diet combined with exercise helps maintain muscle while optimizing fat loss. Strength training is particularly important for building and maintaining muscle mass. Exercise helps reduce stress, increase energy, and improves fitness. Increasing exercise without diet control, however, may not burn enough calories to loose weight.       Start walking three days a week 10-20 minutes at a time    Work towards walking thirty minutes five days a week     Eventually, increase the speed of your walking for 1-2 minutes at time    In addition, we recommend that you review healthy lifestyles and methods for weight loss available through the National Institutes of Health patient information sites:  http://win.niddk.nih.gov/publications/index.htm    And look into health and wellness programs that may be available through your health insurance provider, employer, local community center, or [employer].          Surgery:    Surgery for obstructive sleep apnea is considered generally only when other therapies fail to work. Surgery may be discussed with you if you are having a difficult time tolerating CPAP and or when there is an abnormal structure that requires surgical correction.  Nose and throat surgeries often  enlarge the airway to prevent collapse.  Most of these surgeries create pain for 1-2 weeks and up to half of the most common surgeries are not effective throughout life.  You should carefully discuss the benefits and drawbacks to surgery with your sleep provider and surgeon to determine if it is the best solution for you.   More information  Surgery for FAB is directed at areas that are responsible for narrowing or complete obstruction of the airway during sleep.  There are a wide range of procedures available to enlarge and/or stabilize the airway to prevent blockage of breathing in the three major areas where it can occur: the palate, tongue, and nasal regions.  Successful surgical treatment depends on the accurate identification of the factors responsible for obstructive sleep apnea in each person.  A personalized approach is required because there is no single treatment that works well for everyone.  Because of anatomic variation, consultation with an examination by a sleep surgeon is a critical first step in determining what surgical options are best for each patient.  In some cases, examination during sedation may be recommended in order to guide the selection of procedures.  Patients will be counseled about risks and benefits as well as the typical recovery course after surgery. Surgery is typically not a cure for a person s FAB.  However, surgery will often significantly improve one s FAB severity (termed  success rate ).  Even in the absence of a cure, surgery will decrease the cardiovascular risk associated with OSA7; improve overall quality of life8 (sleepiness, functionality, sleep quality, etc).      Palate Procedures:  Patients with FAB often have narrowing of their airway in the region of their tonsils and uvula.  The goals of palate procedures are to widen the airway in this region as well as to help the tissues resist collapse.  Modern palate procedure techniques focus on tissue conservation and  soft tissue rearrangement, rather than tissue removal.  Often the uvula is preserved in this procedure. Residual sleep apnea is common in patient after pharyngoplasty with an average reduction in sleep apnea events of 33%2.      Tongue Procedures:  ExamWhile patients are awake, the muscles that surround the throat are active and keep this region open for breathing. These muscles relax during sleep, allowing the tongue and other structures to collapse and block breathing.  There are several different tongue procedures available.  Selection of a tongue base procedure depends on characteristics seen on physical exam.  Generally, procedures are aimed at removing bulky tissues in this area or preventing the back of the tongue from falling back during sleep.  Success rates for tongue surgery range from 50-62%3.    Hypoglossal Nerve Stimulation:  Hypoglossal nerve stimulation has recently received approval from the [place] Food and Drug Administration for the treatment of obstructive sleep apnea.  This is based on research showing that the system was safe and effective in treating sleep apnea6.  Results showed that the median AHI score decreased 68%, from 29.3 to 9.0. This therapy uses an implant system that senses breathing patterns and delivers mild stimulation to airway muscles, which keeps the airway open during sleep.  The system consists of three fully implanted components: a small generator (similar in size to a pacemaker), a breathing sensor, and a stimulation lead.  Using a small handheld remote, a patient turns the therapy on before bed and off upon awakening.    Candidates for this device must be greater than 22 years of age, have moderate to severe FAB (AHI between 20-65), BMI less than 32, have tried CPAP/oral appliance without success, and have appropriate upper airway anatomy (determined by a sleep endoscopy performed by Dr. Verma).    Hypoglossal Nerve Stimulation Pathway:    The sleep surgeon s office  will work with the patient through the insurance prior-authorization process (including communications and appeals).    Nasal Procedures:  Nasal obstruction can interfere with nasal breathing during the day and night.  Studies have shown that relief of nasal obstruction can improve the ability of some patients to tolerate positive airway pressure therapy for obstructive sleep apnea1.  Treatment options include medications such as nasal saline, topical corticosteroid and antihistamine sprays, and oral medications such as antihistamines or decongestants. Non-surgical treatments can include external nasal dilators for selected patients. If these are not successful by themselves, surgery can improve the nasal airway either alone or in combination with these other options.      Combination Procedures:  Combination of surgical procedures and other treatments may be recommended, particularly if patients have more than one area of narrowing or persistent positional disease.  The success rate of combination surgery ranges from 66-80%2,3.    References  1. Pinky WHITEHEAD. The Role of the Nose in Snoring and Obstructive Sleep Apnoea: An Update.  Eur Arch Otorhinolaryngol. 2011; 268: 1365-73.  2.  Aly SM; Renate JA; Junior JR; Pallanch JF; Ghislaine MB; María SG; Makeda MORTON. Surgical modifications of the upper airway for obstructive sleep apnea in adults: a systematic review and meta-analysis. SLEEP 2010;33(10):5735-2587. Kasie SHEN. Hypopharyngeal surgery in obstructive sleep apnea: an evidence-based medicine review.  Arch Otolaryngol Head Neck Surg. 2006 Feb;132(2):206-13.  3. Ruben YH1, Kendra Y, Stewart HUNTER. The efficacy of anatomically based multilevel surgery for obstructive sleep apnea. Otolaryngol Head Neck Surg. 2003 Oct;129(4):327-35.  4. Kasie SHEN, Goldberg A. Hypopharyngeal Surgery in Obstructive Sleep Apnea: An Evidence-Based Medicine Review. Arch Otolaryngol Head Neck Surg. 2006 Feb;132(2):206-13.  5. Alyssia FLORES et al.  Upper-Airway Stimulation for Obstructive Sleep Apnea.  N Engl J Med. 2014 Jan 9;370(2):139-49.  6. Elvin Y et al. Increased Incidence of Cardiovascular Disease in Middle-aged Men with Obstructive Sleep Apnea. Am J Respir Crit Care Med; 2002 166: 159-165  7. Mati HERNANDEZ et al. Studying Life Effects and Effectiveness of Palatopharyngoplasty (SLEEP) study: Subjective Outcomes of Isolated Uvulopalatopharyngoplasty. Otolaryngol Head Neck Surg. 2011; 144: 623-631.    Drive Safe... Drive Alive     Sleep health profoundly affects your health, mood, and your safety.  Thirty three percent of the population (one in three of us) is not getting enough sleep and many have a sleep disorder. Not getting enough sleep or having an untreated / undertreated sleep condition may make us sleepy without even knowing it. In fact, our driving could be dramatically impaired due to our sleep health. As your provider, here are some things I would like you to know about driving:     Here are some warning signs for impairment and dangerous drowsy driving:              -Having been awake more than 16 hours               -Looking tired               -Eyelid drooping              -Head nodding (it could be too late at this point)              -Driving for more than 30 minutes     Some things you could do to make the driving safer if you are experiencing some drowsiness:              -Stop driving and rest              -Call for transportation              -Make sure your sleep disorder is adequately treated     Some things that have been shown NOT to work when experiencing drowsiness while driving:              -Turning on the radio              -Opening windows              -Eating any  distracting  /  entertaining  foods (e.g., sunflower seeds, candy, or any other)              -Talking on the phone      Your decision may not only impact your life, but also the life of others. Please, remember to drive safe for yourself and all of us.

## 2021-05-11 ENCOUNTER — VIRTUAL VISIT (OUTPATIENT)
Dept: SLEEP MEDICINE | Facility: CLINIC | Age: 56
End: 2021-05-11
Payer: COMMERCIAL

## 2021-05-11 DIAGNOSIS — G47.9 SLEEP DISTURBANCE: Primary | ICD-10-CM

## 2021-05-11 PROCEDURE — 99204 OFFICE O/P NEW MOD 45 MIN: CPT | Mod: GT | Performed by: INTERNAL MEDICINE

## 2021-06-05 VITALS — HEIGHT: 68 IN | BODY MASS INDEX: 25.46 KG/M2 | WEIGHT: 168 LBS
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
## 2021-06-09 NOTE — PROGRESS NOTES
The patient was seen for a neuropsychological evaluation for the purposes of diagnostic clarification and treatment planning. 41 minutes of telehealth testing were provided by this writer. An additional 27 minutes were spent scoring and compiling test results.The patient was cooperative with testing. No concerns were brought to my attention. Please see Dr. Cuba's report for a detailed description of the charges and interpretation and integration of the findings.    Testing start: 1339 (7/21)  Testing stop: 1420 (7/21)  Scoring start: 1045 (7/22)  Scoring Stop: 1113 (7/22)

## 2021-06-10 NOTE — PROGRESS NOTES
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      The patient has been notified of the following:     \"This video visit will be conducted via a call between you and your provider. We have found that certain health care needs can be provided without the need for an in-person physical exam.  This service lets us provide the care you need with a video conversation. If during the course of the call the physician/provider feels a video visit is not appropriate, you will not be charged for this service.\"    Patient has given verbal consent to a Video visit? Yes  Consent has been obtained for this service by 1 care team member: yes.    Patient would like the video invitation sent by: [employer]    Visit Summary  The purpose of today s appointment was to provide feedback regarding Mr. Aguirre recent neuropsychological Tele-Health consult completed on 7/21/2020. We began the session by discussing his experience during the evaluation. I provided Mr. Aguirre with detailed feedback regarding his performance on cognitive testing and his pattern of cognitive strengths and weaknesses.  I discussed my overall impressions and recommendations. The patient declined a referral for psychotherapy at this time, noting that he was already feeling too overwhelmed with appointments. 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 For this reason, we will hold off on scheduling repeat testing and the patient will monitor his symptoms. If his cognitive issues do not subjectively improve over time, he was encouraged to call the clinic to schedule a follow-up appointment with me for repeat testing.  I provided the opportunity for Mr. Aguirre to ask any questions that he had about the evaluation. At the end of the " session, he indicated that he understood the results and that I had answered all of his questions.       Zeinab Cuba PsyD, LP  Licensed Clinical Neuropsychologist  [facility]  [address], [address]  [place] [address]  Phone: [phone]    Video-Visit Details    Type of service:  Video Visit  Start Time: 10:03 AM  End Time: 10:48 AM    Originating Location (pt. Location): Home    Distant Location (provider location):  Remote work for [employer] ([facility])    Mode of Communication:  Video Conference via [employer]    For diagnostic and coding purposes, Mr. Aguirre was referred for an evaluation of Mild Neurocognitive Disorder. As this is the final date for this Episode of Care (initiated on 7/21/2020) all charges for the entire Episode of Care will be filed today. Please see the 7/21/2020 evaluation for a detailed description of codes and services, including services provided today.      In brief:   1 x 96116  1 x 96132  2 x 96133  1 x 96138  1 x 96139
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
## 2021-06-10 NOTE — PATIENT INSTRUCTIONS - HE
SUMMARY OF BRIEF NEUROPSYCHOLOGICAL TELEHEALTH ASSESSMENT (7/21/2020)    DIAGNOSTIC IMPRESSIONS:  Mild Traumatic Brain Injury, resolving  Mild Neurocognitive Disorder due to Multiple Etiologies  Adjustment Disorder with Mixed Anxiety and Depressed Mood      RECOMMENDATIONS:  1) Given his significant anxiety, depressed mood, and stressors, I strongly recommend establishing care with our psychotherapist in order to help optimize his recovery. I will discuss a referral to our [facility] psychotherapist, Collette Zhou, PhD, during our feedback session. In the meantime, the patient may benefit from utilizing relaxation and stress-management strategies in daily life. He might also consider discussing a change of medication with ROHITH Brown during their next follow-up appointment, as the patient has not noticed any benefit with his current dose of Wellbutrin.     2) With regard to sleep disturbances, the patient may benefit from improved sleep hygiene strategies and relaxation practices to help with sleep initiation. Decreased stress will also likely help his ability to fall asleep. We will also discuss planning out his day/week and creating to-do lists well before bedtime as a way to prevent these thoughts from keeping him awake when attempting to sleep. 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 I recommend trying to coordinate appointments back-to-back either right away in the morning or at the end of the day so that his sleep is less disrupted.      3) Consider a re-evaluation of FAB to determine if he needs a CPAP, as his last evaluation was about 6 years ago. The patient reported frequent apneic spells waking him up at night.     4) Cognitive compensatory strategies are also encouraged to optimize his functioning in daily life. These include utilizing note pads,  checklists, to-do lists, a calendar/planner, alarm reminders, a pillbox, a GPS, and maintaining a daily routine and an organized living/work environment.     5) At this time, no changes to his work schedule or accommodations will be made. He is encouraged to continue to take frequent breaks at work.     6) Ongoing follow-up with his chiropractor, massage therapist, and physical therapy are encouraged to help improve his lingering physical symptoms/pain.      Relaxation Techniques    Search on YouTube and follow along with the videos:  1) Diaphragmatic (deep) Breathing  2) Progressive Muscle Relaxation  3) Guided Imagery/Visualization  4) Meditation  5) Mindfulness activities    Free Relaxation Apps (5-10 minute guided relaxation audios/videos):  1) Calm  2) Head Space  3) Virtual Hope Box    Other Relaxing Activities:  1) Get some fresh air  2) Exercise/go for a walk  3) Take a warm bath  4) Adult coloring books  5) Relaxing music  6) Other activities you enjoy...      Cognitive Strategies    Take notes! Keep a small notepad with you in your purse/pocket/bag and write things down that you want to remember. You might also keep a notepad in a convenient location in your home (e.g., next to your telephone or calendar). Taking your notes in a note pad (instead of on multiple post-it notes) might help you stay organized, and you will also remember where to go to look for the notes that you took.    Create checklists, grocery lists, and to-do lists. Cross things off as you finish them.    Use a calendar or planner and get in the habit of checking it daily. Make it a part of your morning and/or nighttime routine to remind yourself of upcoming events, activities, or appointments. Cross the days off as they pass so that you can quickly glance at the calendar to know what day it is and what you have going on.    Set alarm reminders. For example, you can set an alarm to remind you to take your medications, turn off the oven,  turn off the sprinkler outside, or to start getting ready for your appointment. If you use a smart phone, often times you can label the alarm that goes off so that you know what the alarm is for when it chimes.    Use a pillbox to follow your medication regimen. A pillbox acts as a nice visual cue to remind us to take our medications. If you have trouble remembering whether or not you have already taken your medications today, a pillbox can be extremely helpful to help with this issue.    Use a GPS when driving to help you stay on route.    When having an important conversation or completing an important task, reduce as many distractions in the environment as you can. For example, turn off the TV or the music in the background. Turn off or silence your cell phone. Clear your work area of everything that you do not need for the task at hand.    Establish set locations for certain items. For example, if you keep your keys on a hook by your door, you will always know where to go to look for them.     Maintain a daily routine, especially for morning and nighttime tasks.  Sleep Hygiene    What is Sleep Hygiene?  'Sleep hygiene' is the term used to describe good sleep habits. Considerable research has gone into developing a set of guidelines and tips which are designed to enhance good sleeping, and there is much evidence to suggest that these strategies can provide long-term solutions to sleep difficulties.     Sleep Hygiene Tips:  1) Get regular. One of the best ways to train your body to sleep well is to go to bed and get up at more or less the same time every day, even on weekends and days off! This regular rhythm will make you feel better and will give your body something to work from.    2) Sleep when sleepy. Only try to sleep when you actually feel tired or sleepy, rather than spending too much time awake in bed.    3) Get up & try again. If you haven't been able to get to sleep after about 20 minutes or more, get  up and do something calming or boring until you feel sleepy, then return to bed and try again. Sit quietly on the couch with the lights off (bright light will tell your brain that it is time to wake up), or read something boring like the phone book. Avoid doing anything that is too stimulating or interesting, as this will wake you up even more.    4) Avoid caffeine & nicotine. It is best to avoid consuming any caffeine (in coffee, tea, cola drinks, chocolate, and some medications) or nicotine (cigarettes) for at least 4-6 hours before going to bed. These substances act as stimulants and interfere with the ability to fall asleep.    5) Avoid alcohol. It is also best to avoid alcohol for at least 4-6 hours before going to bed. Many people believe that alcohol is relaxing and helps them to get to sleep at first, but it actually interrupts the quality of sleep.    6) Bed is for sleeping. Try not to use your bed for anything other than sleeping and sex, so that your body comes to associate bed with sleep. If you use bed as a place to watch TV, eat, read, work on your laptop, pay bills, and other things, your body will not learn this connection.    7) No naps. It is best to avoid taking naps during the day, to make sure that you are tired at bedtime. If you can't make it through the day without a nap, make sure it is for less than an hour and before 3pm.    8) Sleep rituals. You can develop your own rituals of things to remind your body that it is time to sleep - some people find it useful to do relaxing stretches or breathing exercises for 15 minutes before bed each night, or sit calmly with a cup of caffeine-free tea.    9) Bath time. Having a hot bath 1-2 hours before bedtime can be useful, as it will raise your body temperature, causing you to feel sleepy as your body temperature drops again. Research shows that sleepiness is associated with a drop in body temperature.    10) No clock-watching. Many people who  struggle with sleep tend to watch the clock too much. Frequently checking the clock during the night can wake you up (especially if you turn on the light to read the time) and reinforces negative thoughts such as  Oh no, look how late it is, I'll never get to sleep  or  it's so early, I have only slept for 5 hours, this is terrible.     11) Use a sleep diary. This worksheet can be a useful way of making sure you have the right facts about your sleep, rather than making assumptions. Because a diary involves watching the clock (see point 10) it is a good idea to only use it for two weeks to get an idea of what is going and then perhaps two months down the track to see how you are progressing.    12) Exercise. Regular exercise is a good idea to help with good sleep, but try not to do strenuous exercise in the 4 hours before bedtime. Morning walks are a great way to start the day feeling refreshed!    13) Eat right. A healthy, balanced diet will help you to sleep well, but timing is important. Some people find that a very empty stomach at bedtime is distracting, so it can be useful to have a light snack, but a heavy meal soon before bed can also interrupt sleep. Some people recommend a warm glass of milk, which contains tryptophan, which acts as a natural sleep inducer.    14) The right space. It is very important that your bed and bedroom are quiet and comfortable for sleeping. A cooler room with enough blankets to stay warm is best, and make sure you have curtains or an eye mask to block out early morning light and earplugs if there is noise outside your room.    15) Keep daytime routine the same. Even if you have a bad night sleep and are tired it is important that you try to keep your daytime activities the same as you had planned. That is, don't avoid activities because you feel tired. This can reinforce the insomnia.    16) Take time to plan during the day. Do your thoughts keep you up all night? It is common for  people to plan out the next day or run through their mental to-do list when they lay down to sleep. Instead of keeping your mind active in this way at night, take time to plan out the next day or week in the morning or afternoon and write down your plan/to-do list in a notebook or calendar. That way, when your thoughts drift to planning at night, you can put your mind at ease more quickly by reminding yourself that everything is already planned out.
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
## 2021-06-16 ENCOUNTER — ANCILLARY PROCEDURE (OUTPATIENT)
Dept: MRI IMAGING | Facility: CLINIC | Age: 56
End: 2021-06-16
Attending: PREVENTIVE MEDICINE
Payer: COMMERCIAL

## 2021-06-16 DIAGNOSIS — M50.30 DDD (DEGENERATIVE DISC DISEASE), CERVICAL: ICD-10-CM

## 2021-06-16 PROCEDURE — 72141 MRI NECK SPINE W/O DYE: CPT | Performed by: RADIOLOGY
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
## 2021-06-20 NOTE — LETTER
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 Employees recovering from concussions often exhibit cognitive symptoms that make attending work and learning difficult. They may not be able to attend work or only partial days. Some symptoms that could affect performance in the work environment  include: sensitivity to light and noise, headache, trouble focusing, concentrating, or remembering, and difficulty looking at a screen. The accommodations below often help reduce the symptoms and allow them to return to work quicker. Compliance with these accommodations allows the brain to recover more quickly even if it appears the employee is symptom free.     Attendance Restrictions  Full shifts as tolerated   Other Restrictions:  1. Allow time off for medical appointments  2. Allow patient to take 10 minute breaks every 60 minutes   3. Allow patient to take a break if he starts to have symptoms, If symptoms continue or worsen please allow patient to return home.  4.  Allow employee to wear sunglasses and hat to help patient's sensitivity to lights.   5.  Patient should be able to leave at 6 AM if he has increased concussive symptoms, severe headache, with no penalty to the patient's job performance  6.  If patient wakes with severe headache please allow patient to start work later, it symptoms worsen patient should not attempt to work.    If you have any questions or concerns, please don't hesitate to call.    Sincerely,        Electronically signed by ROHITH Brown

## 2021-06-26 ENCOUNTER — COMMUNICATION - HEALTHEAST (OUTPATIENT)
Dept: NEUROLOGY | Facility: CLINIC | Age: 56
End: 2021-06-26

## 2021-06-26 DIAGNOSIS — F06.4 ANXIETY DISORDER DUE TO MEDICAL CONDITION: ICD-10-CM

## 2021-08-26 DIAGNOSIS — F07.81 POST CONCUSSION SYNDROME: Primary | ICD-10-CM

## 2021-08-26 RX ORDER — BUPROPION HYDROCHLORIDE 150 MG/1
TABLET ORAL
Qty: 60 TABLET | Refills: 3 | Status: SHIPPED | OUTPATIENT
Start: 2021-08-26 | End: 2022-01-24

## 2021-09-04 ENCOUNTER — HEALTH MAINTENANCE LETTER (OUTPATIENT)
Age: 56
End: 2021-09-04

## 2021-09-24 DIAGNOSIS — F07.81 POST CONCUSSION SYNDROME: Primary | ICD-10-CM

## 2021-10-14 ENCOUNTER — TELEPHONE (OUTPATIENT)
Dept: FAMILY MEDICINE | Facility: CLINIC | Age: 56
End: 2021-10-14

## 2022-01-22 DIAGNOSIS — F07.81 POST CONCUSSION SYNDROME: ICD-10-CM

## 2022-01-24 RX ORDER — BUPROPION HYDROCHLORIDE 150 MG/1
TABLET ORAL
Qty: 60 TABLET | Refills: 3 | Status: SHIPPED | OUTPATIENT
Start: 2022-01-24

## 2022-01-24 NOTE — TELEPHONE ENCOUNTER
Refill request for Bupropion 150mg  Last f/u 3/16/21; No f/u scheduled   Will send message to schedule f/u appt  Medication T'd for review and signature  BELÉN Vail ATC on 1/24/2022 at 7:14 AM

## 2022-02-19 ENCOUNTER — HEALTH MAINTENANCE LETTER (OUTPATIENT)
Age: 57
End: 2022-02-19

## 2022-04-22 DIAGNOSIS — F07.81 POST CONCUSSION SYNDROME: ICD-10-CM

## 2022-04-22 NOTE — TELEPHONE ENCOUNTER
Refill request for amitriptyline (ELAVIL) 25 MG tablet.    Pt was last seen on 3/16/2021. Pt is due for a follow up appt. Will send letter to pt as a reminder to call clinic for a appt.    Medication T'd for review and signature    EILEEN Case on 4/22/2022 at 8:19 AM\

## 2022-10-16 ENCOUNTER — HEALTH MAINTENANCE LETTER (OUTPATIENT)
Age: 57
End: 2022-10-16

## 2023-04-01 ENCOUNTER — HEALTH MAINTENANCE LETTER (OUTPATIENT)
Age: 58
End: 2023-04-01

## 2024-06-01 ENCOUNTER — HEALTH MAINTENANCE LETTER (OUTPATIENT)
Age: 59
End: 2024-06-01